# Patient Record
Sex: MALE | Race: WHITE
[De-identification: names, ages, dates, MRNs, and addresses within clinical notes are randomized per-mention and may not be internally consistent; named-entity substitution may affect disease eponyms.]

---

## 2023-07-18 ENCOUNTER — HOSPITAL ENCOUNTER (EMERGENCY)
Age: 28
LOS: 1 days | Discharge: HOME | End: 2023-07-19
Payer: MEDICAID

## 2023-07-18 VITALS
RESPIRATION RATE: 10 BRPM | DIASTOLIC BLOOD PRESSURE: 67 MMHG | HEART RATE: 93 BPM | OXYGEN SATURATION: 97 % | SYSTOLIC BLOOD PRESSURE: 122 MMHG

## 2023-07-18 VITALS
SYSTOLIC BLOOD PRESSURE: 119 MMHG | OXYGEN SATURATION: 94 % | DIASTOLIC BLOOD PRESSURE: 59 MMHG | RESPIRATION RATE: 12 BRPM | HEART RATE: 105 BPM

## 2023-07-18 VITALS
RESPIRATION RATE: 10 BRPM | HEART RATE: 96 BPM | SYSTOLIC BLOOD PRESSURE: 114 MMHG | DIASTOLIC BLOOD PRESSURE: 72 MMHG | OXYGEN SATURATION: 95 %

## 2023-07-18 VITALS
DIASTOLIC BLOOD PRESSURE: 71 MMHG | HEART RATE: 88 BPM | OXYGEN SATURATION: 97 % | SYSTOLIC BLOOD PRESSURE: 120 MMHG | RESPIRATION RATE: 13 BRPM

## 2023-07-18 VITALS
DIASTOLIC BLOOD PRESSURE: 73 MMHG | OXYGEN SATURATION: 96 % | RESPIRATION RATE: 9 BRPM | SYSTOLIC BLOOD PRESSURE: 107 MMHG | HEART RATE: 97 BPM

## 2023-07-18 VITALS
OXYGEN SATURATION: 95 % | HEART RATE: 98 BPM | DIASTOLIC BLOOD PRESSURE: 70 MMHG | RESPIRATION RATE: 12 BRPM | SYSTOLIC BLOOD PRESSURE: 112 MMHG

## 2023-07-18 VITALS — SYSTOLIC BLOOD PRESSURE: 113 MMHG | OXYGEN SATURATION: 96 % | HEART RATE: 100 BPM | DIASTOLIC BLOOD PRESSURE: 65 MMHG

## 2023-07-18 VITALS
SYSTOLIC BLOOD PRESSURE: 112 MMHG | OXYGEN SATURATION: 93 % | DIASTOLIC BLOOD PRESSURE: 58 MMHG | HEART RATE: 104 BPM | RESPIRATION RATE: 12 BRPM

## 2023-07-18 VITALS
DIASTOLIC BLOOD PRESSURE: 72 MMHG | HEART RATE: 95 BPM | RESPIRATION RATE: 10 BRPM | SYSTOLIC BLOOD PRESSURE: 116 MMHG | OXYGEN SATURATION: 95 %

## 2023-07-18 VITALS — RESPIRATION RATE: 20 BRPM | OXYGEN SATURATION: 99 % | HEART RATE: 143 BPM

## 2023-07-18 VITALS
TEMPERATURE: 97.34 F | HEART RATE: 144 BPM | RESPIRATION RATE: 22 BRPM | DIASTOLIC BLOOD PRESSURE: 87 MMHG | OXYGEN SATURATION: 94 % | SYSTOLIC BLOOD PRESSURE: 134 MMHG

## 2023-07-18 VITALS — RESPIRATION RATE: 24 BRPM | HEART RATE: 139 BPM

## 2023-07-18 VITALS
HEART RATE: 91 BPM | SYSTOLIC BLOOD PRESSURE: 108 MMHG | DIASTOLIC BLOOD PRESSURE: 67 MMHG | OXYGEN SATURATION: 98 % | RESPIRATION RATE: 14 BRPM

## 2023-07-18 VITALS
SYSTOLIC BLOOD PRESSURE: 135 MMHG | DIASTOLIC BLOOD PRESSURE: 85 MMHG | OXYGEN SATURATION: 94 % | HEART RATE: 109 BPM | RESPIRATION RATE: 12 BRPM

## 2023-07-18 VITALS — OXYGEN SATURATION: 96 % | HEART RATE: 96 BPM

## 2023-07-18 VITALS — DIASTOLIC BLOOD PRESSURE: 59 MMHG | HEART RATE: 107 BPM | RESPIRATION RATE: 12 BRPM | SYSTOLIC BLOOD PRESSURE: 120 MMHG

## 2023-07-18 VITALS — DIASTOLIC BLOOD PRESSURE: 61 MMHG | RESPIRATION RATE: 11 BRPM | HEART RATE: 103 BPM | SYSTOLIC BLOOD PRESSURE: 112 MMHG

## 2023-07-18 VITALS
SYSTOLIC BLOOD PRESSURE: 119 MMHG | DIASTOLIC BLOOD PRESSURE: 71 MMHG | HEART RATE: 93 BPM | RESPIRATION RATE: 10 BRPM | OXYGEN SATURATION: 95 %

## 2023-07-18 VITALS
OXYGEN SATURATION: 94 % | RESPIRATION RATE: 23 BRPM | SYSTOLIC BLOOD PRESSURE: 148 MMHG | HEART RATE: 121 BPM | DIASTOLIC BLOOD PRESSURE: 94 MMHG

## 2023-07-18 VITALS — OXYGEN SATURATION: 96 % | RESPIRATION RATE: 9 BRPM | HEART RATE: 94 BPM

## 2023-07-18 VITALS — HEART RATE: 133 BPM

## 2023-07-18 VITALS — BODY MASS INDEX: 22.8 KG/M2

## 2023-07-18 DIAGNOSIS — B37.9: ICD-10-CM

## 2023-07-18 DIAGNOSIS — F17.210: ICD-10-CM

## 2023-07-18 DIAGNOSIS — F15.10: Primary | ICD-10-CM

## 2023-07-18 LAB
ADD MANUAL DIFF: NO
ALANINE AMINOTRANSFERASE: 37 U/L (ref 16–63)
ALBUMIN GLOBULIN RATIO: 1
ALBUMIN LEVEL: 3.7 G/DL (ref 3.4–5)
ALKALINE PHOSPHATASE: 110 U/L (ref 46–116)
ANION GAP: 12.8
ASPARTATE AMINO TRANSFERASE: 24 U/L (ref 15–37)
BLOOD UREA NITROGEN: 13 MG/DL (ref 7–18)
CALCIUM: 8.9 MG/DL (ref 8.5–10.1)
CARBON DIOXIDE: 27 MMOL/L (ref 21–32)
CHLORIDE: 102 MMOL/L (ref 98–107)
CO2 BLD-SCNC: 27 MMOL/L (ref 21–32)
ESTIMATED GFR (AFRICAN AMERICA: >60 (ref 60–?)
ESTIMATED GFR (NON-AFRICAN AME: >60 (ref 60–?)
GLOBULIN: 3.8 G/DL
GLUCOSE BLD-MCNC: 131 MG/DL (ref 74–106)
HCT VFR BLD CALC: 40.3 % (ref 42–54)
HEMATOCRIT: 40.3 % (ref 42–54)
HEMOGLOBIN: 13.7 G/DL (ref 14–18)
IMMATURE GRANULOCYTES ABS AUTO: 0.02 10^3/UL (ref 0–0.03)
IMMATURE GRANULOCYTES PCT AUTO: 0.2 % (ref 0–0.5)
LYMPHOCYTES  ABSOLUTE AUTO: 3.2 10^3/UL (ref 1.2–3.8)
MCV RBC: 86.1 FL (ref 80–94)
MEAN CORPUSCULAR HEMOGLOBIN: 29.3 PG (ref 25.9–34)
MEAN CORPUSCULAR HGB CONC: 34 G/DL (ref 29.9–35.2)
MEAN CORPUSCULAR VOLUME: 86.1 FL (ref 80–94)
PLATELET # BLD: 430 10^3/UL (ref 150–450)
PLATELET COUNT: 430 10^3/UL (ref 150–450)
POTASSIUM SERPLBLD-SCNC: 3.8 MMOL/L (ref 3.5–5.1)
POTASSIUM: 3.8 MMOL/L (ref 3.5–5.1)
RED BLOOD COUNT: 4.68 10^6/UL (ref 4.7–6.1)
SODIUM BLD-SCNC: 138 MMOL/L (ref 136–145)
SODIUM: 138 MMOL/L (ref 136–145)
TOTAL PROTEIN: 7.5 G/DL (ref 6.4–8.2)
WBC # BLD: 9.5 10^3/UL (ref 4–11)
WHITE BLOOD COUNT: 9.5 10^3/UL (ref 4–11)

## 2023-07-18 PROCEDURE — 85025 COMPLETE CBC W/AUTO DIFF WBC: CPT

## 2023-07-18 PROCEDURE — 96375 TX/PRO/DX INJ NEW DRUG ADDON: CPT

## 2023-07-18 PROCEDURE — 70450 CT HEAD/BRAIN W/O DYE: CPT

## 2023-07-18 PROCEDURE — 84484 ASSAY OF TROPONIN QUANT: CPT

## 2023-07-18 PROCEDURE — 99285 EMERGENCY DEPT VISIT HI MDM: CPT

## 2023-07-18 PROCEDURE — 80053 COMPREHEN METABOLIC PANEL: CPT

## 2023-07-18 PROCEDURE — 36415 COLL VENOUS BLD VENIPUNCTURE: CPT

## 2023-07-18 PROCEDURE — 96374 THER/PROPH/DIAG INJ IV PUSH: CPT

## 2023-07-18 PROCEDURE — 93005 ELECTROCARDIOGRAM TRACING: CPT

## 2023-07-18 NOTE — CT_ITS
The [address] [address] 
     [phone] 
  
  
Patient Name: 
YANELI CHRISTOPHER 
  
MRN: [id number]    YOB: 1995    Sex: M 
Assigned Patient Location: ER 
Current Patient Location: ER 
Accession/Order Number: [id number] 
Exam Date: 7/18/2023  23:10    Report Date: 7/18/2023  23:28 
  
At the request of: 
CHRISTINE  MARKER   
  
Procedure:  CT head/brain wo con 
  
EXAMINATION: CT head/brain wo con, 7/18/2023 11:10 PM EDT 
  
HISTORY: AMS 
  
COMPARISON: CT head 3/30/2022.  
  
TECHNIQUE: CT scan of the head was performed without IV contrast. CT dose  
reduction technique was used, including Automated Exposure Control.  
  
FINDINGS:  
  
BRAIN PARENCHYMA/CSF SPACES: Ventricles are normal in size for age. There is  
no  
hemorrhage, mass effect or midline shift. There are no other significant  
findings. 
  
PARANASAL SINUSES: Clear. 
  
SKULL BASE AND CALVARIUM: Normal. 
  
EXTRACRANIAL SOFT TISSUES: Normal. 
  
ORDER #: [id number] CT/CT head/brain wo con  
IMPRESSION:   
   
Normal noncontrast head CT.  
   
   
Electronically authenticated by: SHA DÍAZ   Date: 7/18/2023  23:28

## 2023-07-18 NOTE — ED_ITS
HPI - General Adult    
General    
Chief complaint: Anxiety    
Stated complaint: Panic Attack    
Time Seen by Provider: 07/18/23 19:37    
Source: patient    
Mode of arrival: walk-in    
History of Present Illness    
HPI narrative:     
This 27-year-old male to emergency Department for anxiety and restlessness after  
using methamphetamine and fentanyl actually 3 hours ago. He states he feels   
terrible. He has used these drugs in the past. He denies that he injects them   
intravenously but states he was snorting them and smoking them. Upon arrival he   
is very anxious, moving all over, cannnot calm down and stating that he feels   
terrible. He is not vomiting. He denies any chest pain or shortness of breath.    
Related Data    
                                    Allergies    
    
    
    
Allergy/AdvReac Type Severity Reaction Status Date / Time    
     
No Known Drug Allergies Allergy   Verified 07/18/23 19:42    
    
    
    
    
Review of Systems    
    
    
ROS      
    
 Status of ROS unobtainable due to medical condition (under the influence of   
methamphetamine and fentanyl)       
    
    
PFSH    
PFSH    
Social History    
Smoking status:  Current every day smoker     
    
    
    
Exam    
Narrative    
Exam Narrative:     
Nurses note and vital signs reviewed and patient is not hypoxic.    
    
General:  The patient appears Anxious with psychomotor agitation and nonstop   
movement of his lower extremities, no respiratory distress, GCS 15.    
Skin:  Warm, dry, no pallor noted.  There is no rash noted.    
Head:  Normocephalic, atraumatic    
Eye: Conjunctiva are injected, patient is tearful, no scleral icterus noted    
Ears, Nose, Mouth, and Throat: oral mucosa is moist. Nares patent. Mouth without  
vesicles. Ear canals patent. Tm's without Erythema    
Cardiovascular:  Regular Rate and Rhythm, tachycardia with pulse in the 130-   
140s    
Respiratory:  Patient is in no distress, no accessory muscle use, lungs are   
clear to auscultation, no wheezing, rales or rhonchi    
Back:  non-tender, no CVA tenderness bilaterally to percussion.    
GI:  Normal bowel sounds, no tenderness to palpation, no masses appreciated.  No  
rebound, guarding, or rigidity noted.    
Musculoskeletal: The patient has no evidence of calf tenderness, no pitting   
edema, symmetrical pulses noted bilaterally    
Neurological:  A&O x4, normal speech    
Psychiatric:  agitated, anxious, tearful    
Constitutional    
Vital Signs, click to edit/add:     
    
                                Last Vital Signs    
    
    
    
Temp  97.3 F L  07/18/23 19:38    
     
Pulse  87   07/19/23 05:30    
     
Resp  14   07/19/23 05:30    
     
BP  118/78   07/19/23 05:30    
     
Pulse Ox  97   07/19/23 05:30    
     
O2 Del Method  Room Air  07/18/23 19:38    
    
    
    
    
    
Course    
Vital Signs    
Vital signs:     
    
                                   Vital Signs    
    
    
    
Temperature  97.3 F L  07/18/23 19:38    
     
Pulse Rate  144 H  07/18/23 19:38    
     
Respiratory Rate  22   07/18/23 19:38    
     
Blood Pressure  134/87 H  07/18/23 19:38    
     
Pulse Oximetry  94 L  07/18/23 19:38    
     
Oxygen Delivery Method  Room Air  07/18/23 19:38    
    
    
                                            
    
    
    
Temperature  97.3 F L  07/18/23 19:38    
     
Pulse Rate  87   07/19/23 05:30    
     
Respiratory Rate  14   07/19/23 05:30    
     
Blood Pressure  118/78   07/19/23 05:30    
     
Pulse Oximetry  97   07/19/23 05:30    
     
Oxygen Delivery Method  Room Air  07/18/23 19:38    
    
    
    
    
    
Medical Decision Making    
MDM Narrative    
Medical decision making narrative:     
27-year-old male with a history of substance abuse who has been in and out of   
rehab and sober living environment is presents for evaluation after he   
intentionally snorted and smoked methamphetamine and fentanyl. He has been on   
Suboxone in the past. He presents stating that he is scared and feels terrible.   
He allegedly took the medications approximately 3 hoours prior to arrival. I had  
a lengthy discussion with his mother who states that she thinks that he is   
relapsing because he is currently staying with his father who is dying of   
cancer. She wishes that when he gets discharged he can be released with her. She  
did speak to his boss who is also his uncle and he had a normal day at work. His  
uncle dropped him off at the [facility] to get an ID. Allegedly   
somebody must have picked him up from the [facility] and gave him the drugs that he   
took. Upon arrival he was markedly tachycardic with pulse 130s. EKG was sinus   
tachycardia at 133 beats a minute. An IV was placed and he was medicated with IV  
fluids and 2 mg of IV Ativan. This relieved his agitation and he was able to   
rest and sleep. He has normal CBC with differential and normal comprehensive   
metabolic profile. He has continually been on the cardiac monitor and has not   
had any episodes of hypoxia or tachycardia since given the fluids and Ativan. CT  
scan of the brain was performed due to his tachycardia followed by deep sedation  
and is negative for acute findings. The patient's mother requested to go home   
and be called to come pick him up when he is awake and stable for discharge. The  
patient was monitored overnight in room 6. He is not have any episodes of   
desaturation, tachycardia or hypotension. He is now easily arousable and is   
drinking fluids and having a light snack. His mother called and he will be   
discharged home when she arrives.    
Lab Data    
Labs:     
    
                                   Lab Results    
    
    
    
  07/18/23 Range/Units    
    
  19:31     
     
WBC  9.5  (4.0-11.0)  10^3/uL    
     
RBC  4.68 L  (4.70-6.10)  10^6/uL    
     
Hgb  13.7 L  (14.0-18.0)  g/dL    
     
Hct  40.3 L  (42.0-54.0)  %    
     
MCV  86.1  (80.0-94.0)  fL    
     
MCH  29.3  (25.9-34.0)  pg    
     
MCHC  34.0  (29.9-35.2)  g/dL    
     
RDW  12.0  (11.0-15.0)  %    
     
Plt Count  430  (150-450)  10^3/uL    
     
MPV  9.0 L  (9.5-13.5)  fL    
     
Neut % (Auto)  55.9  (43.0-75.0)  %    
     
Lymph % (Auto)  33.6  (20.5-60.0)  %    
     
Mono % (Auto)  6.8  (1.7-12.0)  %    
     
Eos % (Auto)  2.8  (0.9-7.0)  %    
     
Baso % (Auto)  0.7  (0.2-2.0)  %    
     
Neut # (Auto)  5.3  (1.4-6.5)  10^3/uL    
     
Lymph # (Auto)  3.2  (1.2-3.8)  10^3/uL    
     
Mono # (Auto)  0.6  (0.3-0.8)  10^3/uL    
     
Eos # (Auto)  0.3  (0.0-0.7)  10^3/uL    
     
Baso # (Auto)  0.1  (0.0-0.1)  10^3/uL    
     
Abs Immat Gran (auto)  0.02  (0.00-0.03)  10^3/uL    
     
Imm/Tot Granulo (auto)  0.2  (0.0-0.5)  %    
     
Sodium  138  (136-145)  mmol/L    
     
Potassium  3.8  (3.5-5.1)  mmol/L    
     
Chloride  102  ()  mmol/L    
     
Carbon Dioxide  27.0  (21.0-32.0)  mmol/L    
     
Anion Gap  12.8      
     
BUN  13.0  (7.0-18.0)  mg/dL    
     
Creatinine  0.92  (0.70-1.30)  mg/dL    
     
Est GFR ( Amer)  >60  (>=60)      
     
Est GFR (Non-Af Amer)  >60  (>=60)      
     
BUN/Creatinine Ratio  14.1      
     
Glucose  131 H  ()  mg/dL    
     
Calcium  8.9  (8.5-10.1)  mg/dL    
     
Total Bilirubin  0.1 L  (0.2-1.0)  mg/dL    
     
AST  24  (15-37)  U/L    
     
ALT  37  (16-63)  U/L    
     
Alkaline Phosphatase  110  ()  U/L    
     
Total Protein  7.5  (6.4-8.2)  g/dL    
     
Albumin  3.7  (3.4-5.0)  g/dL    
     
Globulin  3.8  g/dL    
     
Albumin/Globulin Ratio  1.0      
    
    
    
    
ECG Data    
Attestation: I personally reviewed and interpreted this ECG as follows: (Sinus   
tachycardia at 132 beats for minute, right axis deviation, nonspecific ST   
changes, no acute ST segment elevation or T-wave inversion)    
    
Discharge Plan    
Discharge    
Chief Complaint: Anxiety    
    
Clinical Impression:    
 Active substance abuse    
    
    
Patient Disposition: Home, Self-Care    
    
Time of Disposition Decision: 06:03    
    
Condition: Good    
    
Instructions:  Polysubstance Use Disorder (ED)    
    
Stand Alone Forms:  Portal Instructions    
    
Referrals:    
Ashutosh Diaz MD [Primary Care Provider] - 1 week

## 2023-07-18 NOTE — ECG_ITS
The [facility] 
                                        
                                       Test Date:    2023 
Pat Name:     YANELI CHRISTOPHER            Department:    
Patient ID:   [id number]               Room:         - 
Gender:       Male                     Technician:    
:          1995               Requested By: CLARISA CHRISTINE 
Order Number: [id number]              Reading MD:   CLARISA CHRISTINE 
                                 Measurements 
Intervals                              Axis           
Rate:         133                      P:            240 
NH:           164                      QRS:          100 
QRSD:         84                       T:            66 
QT:           306                                     
QTc:          384                                     
                           Interpretive Statements 
1220 Rapid atrial rhythm 
7102 Moderate right axis deviation 
0101 Possible arm leads reversed, check lead requested 
9140 **  abnormal rhythm ECG  ** 
No previous ECG available for comparison 
Electronically Signed On 2023 6:38:02 EDT by CLARISA CHRISTINE

## 2023-07-18 NOTE — ED.GENADUL1
HPI - General Adult
General
Chief complaint: Anxiety
Stated complaint: Panic Attack
Time Seen by Provider: 07/18/23 19:37
Source: patient
Mode of arrival: walk-in
History of Present Illness
HPI narrative: 
This 27-year-old male to emergency Department for anxiety and restlessness after using methamphetamine and fentanyl actually 3 hours ago. He states he feels terrible. He has used these drugs in the past. He denies that he injects them intravenously 
but states he was snorting them and smoking them. Upon arrival he is very anxious, moving all over, cannnot calm down and stating that he feels terrible. He is not vomiting. He denies any chest pain or shortness of breath.
Related Data
Allergies

Allergy/AdvReac Type Severity Reaction Status Date / Time
No Known Drug Allergies Allergy   Verified 07/18/23 19:42



Review of Systems
ROS  
 Status of ROS unobtainable due to medical condition (under the influence of methamphetamine and fentanyl)   

PFSH
PFSH
Social History
Smoking status:  Current every day smoker 



Exam
Narrative
Exam Narrative: 
Nurses note and vital signs reviewed and patient is not hypoxic.

General:  The patient appears Anxious with psychomotor agitation and nonstop movement of his lower extremities, no respiratory distress, GCS 15.
Skin:  Warm, dry, no pallor noted.  There is no rash noted.
Head:  Normocephalic, atraumatic
Eye: Conjunctiva are injected, patient is tearful, no scleral icterus noted
Ears, Nose, Mouth, and Throat: oral mucosa is moist. Nares patent. Mouth without vesicles. Ear canals patent. Tm's without Erythema
Cardiovascular:  Regular Rate and Rhythm, tachycardia with pulse in the 130- 140s
Respiratory:  Patient is in no distress, no accessory muscle use, lungs are clear to auscultation, no wheezing, rales or rhonchi
Back:  non-tender, no CVA tenderness bilaterally to percussion.
GI:  Normal bowel sounds, no tenderness to palpation, no masses appreciated.  No rebound, guarding, or rigidity noted.
Musculoskeletal: The patient has no evidence of calf tenderness, no pitting edema, symmetrical pulses noted bilaterally
Neurological:  A&O x4, normal speech
Psychiatric:  agitated, anxious, tearful
Constitutional
Vital Signs, click to edit/add: 

Last Vital Signs

Temp  97.3 F L  07/18/23 19:38
Pulse  87   07/19/23 05:30
Resp  14   07/19/23 05:30
BP  118/78   07/19/23 05:30
Pulse Ox  97   07/19/23 05:30
O2 Del Method  Room Air  07/18/23 19:38




Course
Vital Signs
Vital signs: 

Vital Signs

Temperature  97.3 F L  07/18/23 19:38
Pulse Rate  144 H  07/18/23 19:38
Respiratory Rate  22   07/18/23 19:38
Blood Pressure  134/87 H  07/18/23 19:38
Pulse Oximetry  94 L  07/18/23 19:38
Oxygen Delivery Method  Room Air  07/18/23 19:38



Temperature  97.3 F L  07/18/23 19:38
Pulse Rate  87   07/19/23 05:30
Respiratory Rate  14   07/19/23 05:30
Blood Pressure  118/78   07/19/23 05:30
Pulse Oximetry  97   07/19/23 05:30
Oxygen Delivery Method  Room Air  07/18/23 19:38




Medical Decision Making
MDM Narrative
Medical decision making narrative: 
27-year-old male with a history of substance abuse who has been in and out of rehab and sober living environment is presents for evaluation after he intentionally snorted and smoked methamphetamine and fentanyl. He has been on Suboxone in the past. 
He presents stating that he is scared and feels terrible. He allegedly took the medications approximately 3 hoours prior to arrival. I had a lengthy discussion with his mother who states that she thinks that he is relapsing because he is currently 
staying with his father who is dying of cancer. She wishes that when he gets discharged he can be released with her. She did speak to his boss who is also his uncle and he had a normal day at work. His uncle dropped him off at the [employer] [facility] [place] to get an ID. Allegedly somebody must have picked him up from the [facility] and gave him the drugs that he took. Upon arrival he was markedly tachycardic with pulse 130s. EKG was sinus tachycardia at 133 beats a minute. An IV was placed and he 
was medicated with IV fluids and 2 mg of IV Ativan. This relieved his agitation and he was able to rest and sleep. He has normal CBC with differential and normal comprehensive metabolic profile. He has continually been on the cardiac monitor and has 
not had any episodes of hypoxia or tachycardia since given the fluids and Ativan. CT scan of the brain was performed due to his tachycardia followed by deep sedation and is negative for acute findings. The patient's mother requested to go home and 
be called to come pick him up when he is awake and stable for discharge. The patient was monitored overnight in room 6. He is not have any episodes of desaturation, tachycardia or hypotension. He is now easily arousable and is drinking fluids and 
having a light snack. His mother called and he will be discharged home when she arrives.
Lab Data
Labs: 

Lab Results

  07/18/23 Range/Units
  19:31 
WBC  9.5  (4.0-11.0)  10^3/uL
RBC  4.68 L  (4.70-6.10)  10^6/uL
Hgb  13.7 L  (14.0-18.0)  g/dL
Hct  40.3 L  (42.0-54.0)  %
MCV  86.1  (80.0-94.0)  fL
MCH  29.3  (25.9-34.0)  pg
MCHC  34.0  (29.9-35.2)  g/dL
RDW  12.0  (11.0-15.0)  %
Plt Count  430  (150-450)  10^3/uL
MPV  9.0 L  (9.5-13.5)  fL
Neut % (Auto)  55.9  (43.0-75.0)  %
Lymph % (Auto)  33.6  (20.5-60.0)  %
Mono % (Auto)  6.8  (1.7-12.0)  %
Eos % (Auto)  2.8  (0.9-7.0)  %
Baso % (Auto)  0.7  (0.2-2.0)  %
Neut # (Auto)  5.3  (1.4-6.5)  10^3/uL
Lymph # (Auto)  3.2  (1.2-3.8)  10^3/uL
Mono # (Auto)  0.6  (0.3-0.8)  10^3/uL
Eos # (Auto)  0.3  (0.0-0.7)  10^3/uL
Baso # (Auto)  0.1  (0.0-0.1)  10^3/uL
Abs Immat Gran (auto)  0.02  (0.00-0.03)  10^3/uL
Imm/Tot Granulo (auto)  0.2  (0.0-0.5)  %
Sodium  138  (136-145)  mmol/L
Potassium  3.8  (3.5-5.1)  mmol/L
Chloride  102  ()  mmol/L
Carbon Dioxide  27.0  (21.0-32.0)  mmol/L
Anion Gap  12.8  
BUN  13.0  (7.0-18.0)  mg/dL
Creatinine  0.92  (0.70-1.30)  mg/dL
Est GFR ( Amer)  >60  (>=60)  
Est GFR (Non-Af Amer)  >60  (>=60)  
BUN/Creatinine Ratio  14.1  
Glucose  131 H  ()  mg/dL
Calcium  8.9  (8.5-10.1)  mg/dL
Total Bilirubin  0.1 L  (0.2-1.0)  mg/dL
AST  24  (15-37)  U/L
ALT  37  (16-63)  U/L
Alkaline Phosphatase  110  ()  U/L
Total Protein  7.5  (6.4-8.2)  g/dL
Albumin  3.7  (3.4-5.0)  g/dL
Globulin  3.8  g/dL
Albumin/Globulin Ratio  1.0  



ECG Data
Attestation: I personally reviewed and interpreted this ECG as follows: (Sinus tachycardia at 132 beats for minute, right axis deviation, nonspecific ST changes, no acute ST segment elevation or T-wave inversion)

Discharge Plan
Discharge
Chief Complaint: Anxiety

Clinical Impression:
 Active substance abuse


Patient Disposition: Home, Self-Care

Time of Disposition Decision: 06:03

Condition: Good

Instructions:  Polysubstance Use Disorder (ED)

Stand Alone Forms:  Portal Instructions

Referrals:
Ashutosh Diaz MD [Primary Care Provider] - 1 week

## 2023-07-19 VITALS
RESPIRATION RATE: 15 BRPM | SYSTOLIC BLOOD PRESSURE: 118 MMHG | OXYGEN SATURATION: 100 % | DIASTOLIC BLOOD PRESSURE: 82 MMHG

## 2023-07-19 VITALS
OXYGEN SATURATION: 98 % | SYSTOLIC BLOOD PRESSURE: 102 MMHG | DIASTOLIC BLOOD PRESSURE: 74 MMHG | HEART RATE: 88 BPM | RESPIRATION RATE: 15 BRPM

## 2023-07-19 VITALS
DIASTOLIC BLOOD PRESSURE: 85 MMHG | RESPIRATION RATE: 12 BRPM | OXYGEN SATURATION: 98 % | SYSTOLIC BLOOD PRESSURE: 112 MMHG | HEART RATE: 85 BPM

## 2023-07-19 VITALS
DIASTOLIC BLOOD PRESSURE: 78 MMHG | HEART RATE: 89 BPM | OXYGEN SATURATION: 98 % | RESPIRATION RATE: 9 BRPM | SYSTOLIC BLOOD PRESSURE: 114 MMHG

## 2023-07-19 VITALS — SYSTOLIC BLOOD PRESSURE: 122 MMHG | DIASTOLIC BLOOD PRESSURE: 81 MMHG | HEART RATE: 83 BPM | OXYGEN SATURATION: 98 %

## 2023-07-19 VITALS — DIASTOLIC BLOOD PRESSURE: 86 MMHG | SYSTOLIC BLOOD PRESSURE: 109 MMHG

## 2023-07-19 VITALS
HEART RATE: 89 BPM | RESPIRATION RATE: 14 BRPM | DIASTOLIC BLOOD PRESSURE: 78 MMHG | SYSTOLIC BLOOD PRESSURE: 118 MMHG | OXYGEN SATURATION: 97 %

## 2023-07-19 VITALS — DIASTOLIC BLOOD PRESSURE: 81 MMHG | HEART RATE: 87 BPM | OXYGEN SATURATION: 98 % | SYSTOLIC BLOOD PRESSURE: 124 MMHG

## 2023-07-19 VITALS
SYSTOLIC BLOOD PRESSURE: 116 MMHG | RESPIRATION RATE: 17 BRPM | OXYGEN SATURATION: 97 % | DIASTOLIC BLOOD PRESSURE: 71 MMHG | HEART RATE: 98 BPM

## 2023-07-19 VITALS — DIASTOLIC BLOOD PRESSURE: 80 MMHG | RESPIRATION RATE: 11 BRPM | SYSTOLIC BLOOD PRESSURE: 116 MMHG | HEART RATE: 81 BPM

## 2023-07-19 VITALS
RESPIRATION RATE: 10 BRPM | SYSTOLIC BLOOD PRESSURE: 110 MMHG | DIASTOLIC BLOOD PRESSURE: 73 MMHG | OXYGEN SATURATION: 96 % | HEART RATE: 85 BPM

## 2023-07-19 VITALS
SYSTOLIC BLOOD PRESSURE: 103 MMHG | RESPIRATION RATE: 13 BRPM | HEART RATE: 85 BPM | OXYGEN SATURATION: 97 % | DIASTOLIC BLOOD PRESSURE: 73 MMHG

## 2023-07-19 VITALS
RESPIRATION RATE: 8 BRPM | DIASTOLIC BLOOD PRESSURE: 70 MMHG | OXYGEN SATURATION: 98 % | HEART RATE: 86 BPM | SYSTOLIC BLOOD PRESSURE: 99 MMHG

## 2023-09-21 ENCOUNTER — HOSPITAL ENCOUNTER (EMERGENCY)
Age: 28
LOS: 1 days | Discharge: HOME | End: 2023-09-22
Payer: MEDICAID

## 2023-09-21 VITALS
TEMPERATURE: 97.1 F | OXYGEN SATURATION: 97 % | HEART RATE: 117 BPM | DIASTOLIC BLOOD PRESSURE: 60 MMHG | SYSTOLIC BLOOD PRESSURE: 102 MMHG | RESPIRATION RATE: 20 BRPM

## 2023-09-21 VITALS — BODY MASS INDEX: 23.1 KG/M2

## 2023-09-21 DIAGNOSIS — F11.23: Primary | ICD-10-CM

## 2023-09-21 DIAGNOSIS — F17.210: ICD-10-CM

## 2023-09-21 DIAGNOSIS — G25.81: ICD-10-CM

## 2023-09-21 DIAGNOSIS — Z79.899: ICD-10-CM

## 2023-09-21 PROCEDURE — 96374 THER/PROPH/DIAG INJ IV PUSH: CPT

## 2023-09-21 PROCEDURE — 80053 COMPREHEN METABOLIC PANEL: CPT

## 2023-09-21 PROCEDURE — 96361 HYDRATE IV INFUSION ADD-ON: CPT

## 2023-09-21 PROCEDURE — 99285 EMERGENCY DEPT VISIT HI MDM: CPT

## 2023-09-21 PROCEDURE — 93005 ELECTROCARDIOGRAM TRACING: CPT

## 2023-09-21 PROCEDURE — 36415 COLL VENOUS BLD VENIPUNCTURE: CPT

## 2023-09-21 PROCEDURE — 96376 TX/PRO/DX INJ SAME DRUG ADON: CPT

## 2023-09-21 PROCEDURE — 96375 TX/PRO/DX INJ NEW DRUG ADDON: CPT

## 2023-09-21 PROCEDURE — 85025 COMPLETE CBC W/AUTO DIFF WBC: CPT

## 2023-09-21 NOTE — PC.NURSE
patient states he has been trying to detox at home since monday and is currently going through withdraw. states he last had alcohol on monday and last used fentanyl on monday. mother states he saw his PCP approx2 days ago and was prescribed 
medication but the medication does not seem to be helping enough. patient states he feels shaky, has restless legs, and is nauseous. states he just needs help getting through these symptoms. states he has been to a detox center previously but does 
not want to go to another center at this time, states he wants to continue trying to detox at home.

## 2023-09-22 VITALS — OXYGEN SATURATION: 98 %

## 2023-09-22 VITALS
OXYGEN SATURATION: 96 % | RESPIRATION RATE: 16 BRPM | DIASTOLIC BLOOD PRESSURE: 78 MMHG | SYSTOLIC BLOOD PRESSURE: 112 MMHG

## 2023-09-22 LAB
ADD MANUAL DIFF: NO
ALANINE AMINOTRANSFERASE: 25 U/L (ref 16–63)
ALBUMIN GLOBULIN RATIO: 1.1
ALBUMIN LEVEL: 3.2 G/DL (ref 3.4–5)
ALKALINE PHOSPHATASE: 59 U/L (ref 46–116)
ANION GAP: 12.5
ASPARTATE AMINO TRANSFERASE: 12 U/L (ref 15–37)
BLOOD UREA NITROGEN: 10 MG/DL (ref 7–18)
CALCIUM: 8.5 MG/DL (ref 8.5–10.1)
CARBON DIOXIDE: 24.7 MMOL/L (ref 21–32)
CHLORIDE: 101 MMOL/L (ref 98–107)
CO2 BLD-SCNC: 24.7 MMOL/L (ref 21–32)
ESTIMATED GFR (AFRICAN AMERICA: >60 (ref 60–?)
ESTIMATED GFR (NON-AFRICAN AME: >60 (ref 60–?)
GLOBULIN: 2.9 G/DL
GLUCOSE BLD-MCNC: 100 MG/DL (ref 74–106)
HCT VFR BLD CALC: 35.9 % (ref 42–54)
HEMATOCRIT: 35.9 % (ref 42–54)
HEMOGLOBIN: 12.4 G/DL (ref 14–18)
IMMATURE GRANULOCYTES ABS AUTO: 0.01 10^3/UL (ref 0–0.03)
IMMATURE GRANULOCYTES PCT AUTO: 0.1 % (ref 0–0.5)
LYMPHOCYTES  ABSOLUTE AUTO: 1.9 10^3/UL (ref 1.2–3.8)
MCV RBC: 85.1 FL (ref 80–94)
MEAN CORPUSCULAR HEMOGLOBIN: 29.4 PG (ref 25.9–34)
MEAN CORPUSCULAR HGB CONC: 34.5 G/DL (ref 29.9–35.2)
MEAN CORPUSCULAR VOLUME: 85.1 FL (ref 80–94)
PLATELET # BLD: 280 10^3/UL (ref 150–450)
PLATELET COUNT: 280 10^3/UL (ref 150–450)
POTASSIUM SERPLBLD-SCNC: 3.2 MMOL/L (ref 3.5–5.1)
POTASSIUM: 3.2 MMOL/L (ref 3.5–5.1)
RED BLOOD COUNT: 4.22 10^6/UL (ref 4.7–6.1)
SODIUM BLD-SCNC: 135 MMOL/L (ref 136–145)
SODIUM: 135 MMOL/L (ref 136–145)
TOTAL PROTEIN: 6.1 G/DL (ref 6.4–8.2)
WBC # BLD: 7.8 10^3/UL (ref 4–11)
WHITE BLOOD COUNT: 7.8 10^3/UL (ref 4–11)

## 2023-09-22 NOTE — ED_ITS
HPI - General Adult    
General    
Chief complaint: Alcohol    
Stated complaint: detox day 4    
Time Seen by Provider: 09/21/23 23:55    
Source: patient and family (mother)    
History of Present Illness    
HPI narrative:     
This 27-year-old male with a history of substance abuse particularly heroin and   
fentanyl who has been attempting to detox at home for the past 2 days is brought  
to the emergency department by his mother for evaluation of restless legs,   
generalized malaise, nausea and diarrhea. He was seen recently by Namita Ramirez and   
prescribed several medications to help him with his withdrawal symptoms. The   
patient states that he does occasionally drink alcohol but never typically has   
or than 3 beers. Upon arrival he is somnolent and difficult to examine. When I   
was able to stimulate him to sit up he started thrashing around with psychomotor  
movements of his arms and legs. He states that he cannot control his shaking.   
This immediately is followed by resting on the bed with no psychomotor   
agitation. He states that the last time he used any fentanyl was 3 days ago. He   
has been trying to detox at his girlfriends mothers house but went to his   
mother's house this evening and requested to be brought here. The mother states   
that he also uses methamphetamine. She thinks that he typically uses   
methamphetamine to the point that he cannot rest and then uses opiates to get   
him to sleep. He has been in detox centers before which have not seemed to help   
him. The mother states she thinks that they just sedate him the whole time is   
there and when he leaves he uses again.    
Related Data    
                                Home Medications    
    
    
    
 Medication  Instructions  Recorded  Confirmed    
     
dicyclomine 20 mg tablet 20 mg PO TID 09/21/23 09/21/23    
     
pramipexole 0.5 mg tablet 0.5 mg PO DAILY 09/21/23 09/21/23    
     
quetiapine 50 mg tablet 50 mg PO DAILY 09/21/23 09/21/23    
     
tizanidine 4 mg tablet 4 mg PO BID PRN muscle spasticity 09/21/23 09/21/23    
    
    
    
                                    Allergies    
    
    
    
Allergy/AdvReac Type Severity Reaction Status Date / Time    
     
No Known Drug Allergies Allergy   Verified 09/21/23 23:49    
    
    
    
    
Review of Systems    
    
    
ROS      
    
 Status of ROS 10 or more systems reviewed and unremarkable except as noted in   
history and below       
    
    
PFSH    
PFSH    
Social History    
Smoking status:  Current every day smoker     
    
    
    
Exam    
Narrative    
Exam Narrative:     
Nurses note and vital signs reviewed; He is afebrile, tachycardic with a pulse   
of 117, blood pressure is moderately low 102/60, he is not hypoxic with pulse ox  
of 97 percent on room air    
    
General: Sleeping adult male lying on his right side, when stimulated he jumps   
up on the bed and starts shaking his legs simulating restless leg syndrome, he   
promptly then lays back down and is motionless    
Skin:  Warm, dry, no pallor noted.  There is no rash noted.    
Head:  Normocephalic, atraumatic    
Eye: Normal conjunctiva, no drainage, EOMI. PERRL    
Ears, Nose, Mouth, and Throat: oral mucosa is moist. Pt is edentulous    
Cardiovascular:  Regular Rate and Rhythm tachycardic at 117, no murmurs, rubs or  
gallops     
Respiratory:  Patient is in no distress, no accessory muscle use, lungs are   
clear to auscultation, no wheezing, rales or rhonchi    
Back:  non-tender, no CVA tenderness bilaterally to percussion.    
GI:  Normal bowel sounds, no tenderness to palpation, no masses appreciated.  No  
rebound, guarding, or rigidity noted.    
Musculoskeletal: Moving all extremities    
Neurological:  A&O x4, normal speech, no focal neurologic deficits    
Psychiatric: somnolence followed by psychomotor agitation followed by   
somnolence, does not verbalize homicidal or suicidal ideation    
Constitutional    
Vital Signs, click to edit/add:     
    
                                Last Vital Signs    
    
    
    
Temp  97.1 F L  09/21/23 23:43    
     
Pulse  117 H  09/21/23 23:43    
     
Resp  16   09/22/23 02:23    
     
BP  112/78   09/22/23 02:23    
     
Pulse Ox  96   09/22/23 02:23    
    
    
    
    
    
Course    
Vital Signs    
Vital signs:     
    
                                   Vital Signs    
    
    
    
Temperature  97.1 F L  09/21/23 23:43    
     
Pulse Rate  117 H  09/21/23 23:43    
     
Respiratory Rate  20   09/21/23 23:43    
     
Blood Pressure  102/60   09/21/23 23:43    
     
Pulse Oximetry  97   09/21/23 23:43    
    
    
                                            
    
    
    
Temperature  97.1 F L  09/21/23 23:43    
     
Pulse Rate  117 H  09/21/23 23:43    
     
Respiratory Rate  16   09/22/23 02:23    
     
Blood Pressure  112/78   09/22/23 02:23    
     
Pulse Oximetry  96   09/22/23 02:23    
    
    
    
    
    
Medical Decision Making    
MDM Narrative    
Medical decision making narrative:     
This 27-year-old male is brought emergency department by his mother who is   
concerned about his health as he is going through opiate withdrawal. He is on   
several medications that were recently prescribed to help him with his   
withdrawal symptoms. He has been staying at his girlfriend's mother's house   
until today when he went to his mother's house and asked her to bring him here.   
He is intermittently sleeping and then having episodes of psychomotor agitation.  
He states that he has restless leg syndrome. He is on medications for this. He   
has had nausea and some diarrhea. He has not had any vomiting while in emergency  
department. He is not an alcohol user excessively but does occasionally drink   
some beers. His symptoms do not appear to be related to alcohol withdrawal. EKG   
done upon arrival sinus tachycardia 100 bpm with no acute changes. An IV was   
placed and routine labs are ordered. His labs are normal with the exception of a  
mildly low potassium which was replaced orally. He was medicated in the   
emergency department with normal saline, Zofran, 1 mg of Ativan. He is   
tolerating clear liquids. He was able to tolerate oral potassium replacement. On  
reevaluation he appeared to be feeling somewhat better but would awaken with   
acute agitation. His mother seems very concerned and I agreed to give him an   
additional dose of Ativan as well as D5 LR to clear any ketosis as he has not   
eaten in the past several days. He does appear to be doing better at this time   
and his mother is comfortable taking him home. I extended her that there is no   
quick fix for opiate withdrawal. He should continue the medications that he is   
currently on. I encouraged him to drink plenty of clear liquids. He is scheduled  
to get a Vivitrol shot early next week.    
Lab Data    
Labs:     
    
                                   Lab Results    
    
    
    
  09/22/23 Range/Units    
    
  00:12     
     
WBC  7.8  (4.0-11.0)  10^3/uL    
     
RBC  4.22 L  (4.70-6.10)  10^6/uL    
     
Hgb  12.4 L  (14.0-18.0)  g/dL    
     
Hct  35.9 L  (42.0-54.0)  %    
     
MCV  85.1  (80.0-94.0)  fL    
     
MCH  29.4  (25.9-34.0)  pg    
     
MCHC  34.5  (29.9-35.2)  g/dL    
     
RDW  11.8  (11.0-15.0)  %    
     
Plt Count  280  (150-450)  10^3/uL    
     
MPV  9.0 L  (9.5-13.5)  fL    
     
Neut % (Auto)  69.3  (43.0-75.0)  %    
     
Lymph % (Auto)  23.8  (20.5-60.0)  %    
     
Mono % (Auto)  5.7  (1.7-12.0)  %    
     
Eos % (Auto)  0.8 L  (0.9-7.0)  %    
     
Baso % (Auto)  0.3  (0.2-2.0)  %    
     
Neut # (Auto)  5.4  (1.4-6.5)  10^3/uL    
     
Lymph # (Auto)  1.9  (1.2-3.8)  10^3/uL    
     
Mono # (Auto)  0.5  (0.3-0.8)  10^3/uL    
     
Eos # (Auto)  0.1  (0.0-0.7)  10^3/uL    
     
Baso # (Auto)  0.0  (0.0-0.1)  10^3/uL    
     
Abs Immat Gran (auto)  0.01  (0.00-0.03)  10^3/uL    
     
Imm/Tot Granulo (auto)  0.1  (0.0-0.5)  %    
     
Sodium  135 L  (136-145)  mmol/L    
     
Potassium  3.2 L  (3.5-5.1)  mmol/L    
     
Chloride  101  ()  mmol/L    
     
Carbon Dioxide  24.7  (21.0-32.0)  mmol/L    
     
Anion Gap  12.5      
     
BUN  10.0  (7.0-18.0)  mg/dL    
     
Creatinine  0.76  (0.70-1.30)  mg/dL    
     
Est GFR ( Amer)  >60  (>=60)      
     
Est GFR (Non-Af Amer)  >60  (>=60)      
     
BUN/Creatinine Ratio  13.2      
     
Glucose  100  ()  mg/dL    
     
Calcium  8.5  (8.5-10.1)  mg/dL    
     
Total Bilirubin  0.3  (0.2-1.0)  mg/dL    
     
AST  12 L  (15-37)  U/L    
     
ALT  25  (16-63)  U/L    
     
Alkaline Phosphatase  59  ()  U/L    
     
Total Protein  6.1 L  (6.4-8.2)  g/dL    
     
Albumin  3.2 L  (3.4-5.0)  g/dL    
     
Globulin  2.9  g/dL    
     
Albumin/Globulin Ratio  1.1      
    
    
    
    
ECG Data    
Attestation: I personally reviewed and interpreted this ECG as follows: (Tennis   
tachycardia at 100 beats for minute, normal axis, Normal intervals,moderate   
right axis deviation, no acute ST segment elevation or T-wave inversion)    
    
Discharge Plan    
Discharge    
Chief Complaint: Alcohol    
    
Clinical Impression:    
 Opiate withdrawal    
    
    
Patient Disposition: Home, Self-Care    
    
Time of Disposition Decision: 03:00    
    
Condition: Fair    
    
Prescriptions / Home Meds:    
No Action    
  tizanidine 4 mg tablet     
   4 mg PO BID PRN (Reason: muscle spasticity)     
  quetiapine 50 mg tablet     
   50 mg PO DAILY     
   Rx Instructions:    
   1-2 tablets at HS for insomnia    
  pramipexole 0.5 mg tablet     
   0.5 mg PO DAILY     
   Rx Instructions:    
   1-3 tablets at bed time for restless legs    
  dicyclomine 20 mg tablet     
   20 mg PO TID     
    
Instructions:  Opioid Withdrawal (ED), Narcotic Withdrawal (ED)    
    
Stand Alone Forms:  Portal Instructions    
    
Referrals:    
Ashutosh Diaz MD [Primary Care Provider] - 1 week

## 2023-09-22 NOTE — ECG_ITS
[facility] 
                                        
                                       Test Date:    2023 
Pat Name:     YANELI CHRISTOPHER            Department:    
Patient ID:   [id number]               Room:         - 
Gender:       Male                     Technician:    
:          [id number]               Requested By: [id number] 
Order Number: [id number]              Reading MD:   CLARISA CHRISTINE 
                                 Measurements 
Intervals                              Axis           
Rate:         100                      P:            73 
UT:           152                      QRS:          94 
QRSD:         90                       T:            72 
QT:           352                                     
QTc:          409                                     
                           Interpretive Statements 
Non-Specific T wave inversion in aVL 
1120 Sinus tachycardia 
1570 with occasional ventricular premature complexes 
7102 Moderate right axis deviation 
9140 **  abnormal rhythm ECG  ** 
Compared to ECG 2023 19:48:02 
Ventricular premature complex(es) now present 
Electronically Signed On 2023 7:16:33 EDT by CLARISA CHRISTINE

## 2023-09-23 ENCOUNTER — HOSPITAL ENCOUNTER (EMERGENCY)
Age: 28
Discharge: HOME | End: 2023-09-23
Payer: MEDICAID

## 2023-09-23 VITALS — OXYGEN SATURATION: 100 % | HEART RATE: 122 BPM

## 2023-09-23 VITALS
RESPIRATION RATE: 17 BRPM | HEART RATE: 124 BPM | OXYGEN SATURATION: 100 % | DIASTOLIC BLOOD PRESSURE: 89 MMHG | SYSTOLIC BLOOD PRESSURE: 136 MMHG

## 2023-09-23 DIAGNOSIS — Z79.899: ICD-10-CM

## 2023-09-23 DIAGNOSIS — F11.23: Primary | ICD-10-CM

## 2023-09-23 DIAGNOSIS — F17.210: ICD-10-CM

## 2023-09-23 PROCEDURE — 99283 EMERGENCY DEPT VISIT LOW MDM: CPT

## 2023-09-23 PROCEDURE — 93005 ELECTROCARDIOGRAM TRACING: CPT

## 2023-09-23 NOTE — ED.GENADUL1
HPI - General Adult
General
Chief complaint: Dizziness
Stated complaint: dizzy
Time Seen by Provider: 09/23/23 00:53
Source: patient
Mode of arrival: walk-in
Limitations: no limitations
History of Present Illness
HPI narrative: 
patient has history of polysubstance abuse. Fentanyl, xanax etc. Was seen and started on treatment for withdrawal including Mirapex, zanaflex, bentyl and seroquel. Tonight his sister was concerned about his shakes and body movements. He states he 
feels light headed. No vomiting. He states he took one xanax yesterday. States he had saved some xanax pills to help him deal with withdrawal if he needed them. Does not take xanax regularly 
Last use of drugs , other than xanax was 4 days ago
Related Data
Home Medications

 Medication  Instructions  Recorded  Confirmed
dicyclomine 20 mg tablet 20 mg PO TID 09/21/23 09/21/23
pramipexole 0.5 mg tablet 0.5 mg PO DAILY 09/21/23 09/21/23
quetiapine 50 mg tablet 50 mg PO DAILY 09/21/23 09/21/23
tizanidine 4 mg tablet 4 mg PO BID PRN muscle spasticity 09/21/23 09/21/23


Allergies

Allergy/AdvReac Type Severity Reaction Status Date / Time
No Known Drug Allergies Allergy   Verified 09/21/23 23:49



Review of Systems
ROS  
 Status of ROS 10 or more systems reviewed and unremarkable except as noted in history and below   

[facility]
PFS
Social History
Smoking status:  Current every day smoker 



Exam
Constitutional
Vital Signs, click to edit/add: 

Last Vital Signs

Pulse  124 H  09/23/23 00:48
Resp  17   09/23/23 00:48
BP  136/89   09/23/23 00:48
Pulse Ox  100   09/23/23 00:48
O2 Del Method  Room Air  09/23/23 00:48



Common normals: average body habitus, oriented x3 and alert
Other: 
rocking back and forth but not in discomfort
Eye
Common normals: EOMs intact bilaterally and conjunctivae normal
Respiratory
Common normals: normal respiratory effort and no retractions
Cardio
Rate: tachycardic
GI
Common normals: Normal to inspection, nondistended, normoactive bowel sounds present and soft to palpation
Extremity
Common normals: normal to inspection and full ROM
Neuro
Common normals: oriented x3, CN's II-XII intact bilaterally, moves all extremities and no focal motor deficits
Psych
Appearance: grossly normal

Course
Vital Signs
Vital signs: 

Vital Signs

Pulse Rate  124 H  09/23/23 00:48
Respiratory Rate  17   09/23/23 00:48
Blood Pressure  136/89   09/23/23 00:48
Pulse Oximetry  100   09/23/23 00:48
Oxygen Delivery Method  Room Air  09/23/23 00:48



Pulse Rate  124 H  09/23/23 00:48
Respiratory Rate  17   09/23/23 00:48
Blood Pressure  136/89   09/23/23 00:48
Pulse Oximetry  100   09/23/23 00:48
Oxygen Delivery Method  Room Air  09/23/23 00:48




Medical Decision Making
MDM Narrative
Medical decision making narrative: 
patient presents with mild withdrawal symptoms. Restless and not able to sit stil . occ jerks. AxOx4
cooperative. His sister was concern he was withdrawing from Xanax and that it could get worse. He informed of both that he only took one xanax yesterday and does not take them regularly. Patient and sister informed he should not experience 
withdrawal from one xanax pill. He does have withdrawal symptoms from his opiates . Clinically he does look stable but is tachy and advised to increase fluid intake

Discharge Plan
Discharge
Chief Complaint: Dizziness

Clinical Impression:
 Opiate withdrawal


Patient Disposition: Home, Self-Care

Prescriptions / Home Meds:
No Action
  tizanidine 4 mg tablet 
   4 mg PO BID PRN (Reason: muscle spasticity) 
  quetiapine 50 mg tablet 
   50 mg PO DAILY 
   Rx Instructions:
   1-2 tablets at HS for insomnia
  pramipexole 0.5 mg tablet 
   0.5 mg PO DAILY 
   Rx Instructions:
   1-3 tablets at bed time for restless legs
  dicyclomine 20 mg tablet 
   20 mg PO TID 

Instructions:  Opioid Withdrawal (ED)

Stand Alone Forms:  Portal Instructions

Referrals:
Ashutosh Diaz MD [Primary Care Provider] - 1 week

## 2023-09-23 NOTE — ECG_ITS
[facility] 
                                        
                                       Test Date:    2023 
Pat Name:     YANELI CHRISTOPHER            Department:    
Patient ID:   [id number]               Room:         - 
Gender:       Male                     Technician:    
:          [id number]               Requested By: [id number] 
Order Number: [id number]              Reading MD:   CLARISA CHRISTINE 
                                 Measurements 
Intervals                              Axis           
Rate:         104                      P:            55 
PA:           124                      QRS:          101 
QRSD:         86                       T:            67 
QT:           338                                     
QTc:          399                                     
                           Interpretive Statements 
1120 Sinus tachycardia 
7100 Abnormal right axis deviation 
9140 **  abnormal rhythm ECG  ** 
Compared to ECG 2023 23:51:50 
T-wave abnormality no longer present 
Ventricular premature complex(es) no longer present 
Electronically Signed On 2023 7:16:51 EDT by CLARISA CHRISTINE

## 2023-09-23 NOTE — ED_ITS
HPI - General Adult    
General    
Chief complaint: Dizziness    
Stated complaint: dizzy    
Time Seen by Provider: 09/23/23 00:53    
Source: patient    
Mode of arrival: walk-in    
Limitations: no limitations    
History of Present Illness    
HPI narrative:     
patient has history of polysubstance abuse. Fentanyl, xanax etc. Was seen and   
started on treatment for withdrawal including Mirapex, zanaflex, bentyl and   
seroquel. Tonight his sister was concerned about his shakes and body movements.   
He states he feels light headed. No vomiting. He states he took one xanax   
yesterday. States he had saved some xanax pills to help him deal with withdrawal  
if he needed them. Does not take xanax regularly     
Last use of drugs , other than xanax was 4 days ago    
Related Data    
                                Home Medications    
    
    
    
 Medication  Instructions  Recorded  Confirmed    
     
dicyclomine 20 mg tablet 20 mg PO TID 09/21/23 09/21/23    
     
pramipexole 0.5 mg tablet 0.5 mg PO DAILY 09/21/23 09/21/23    
     
quetiapine 50 mg tablet 50 mg PO DAILY 09/21/23 09/21/23    
     
tizanidine 4 mg tablet 4 mg PO BID PRN muscle spasticity 09/21/23 09/21/23    
    
    
    
                                    Allergies    
    
    
    
Allergy/AdvReac Type Severity Reaction Status Date / Time    
     
No Known Drug Allergies Allergy   Verified 09/21/23 23:49    
    
    
    
    
Review of Systems    
    
    
ROS      
    
 Status of ROS 10 or more systems reviewed and unremarkable except as noted in   
history and below       
    
    
[facility]    
PFS    
Social History    
Smoking status:  Current every day smoker     
    
    
    
Exam    
Constitutional    
Vital Signs, click to edit/add:     
    
                                Last Vital Signs    
    
    
    
Pulse  124 H  09/23/23 00:48    
     
Resp  17   09/23/23 00:48    
     
BP  136/89   09/23/23 00:48    
     
Pulse Ox  100   09/23/23 00:48    
     
O2 Del Method  Room Air  09/23/23 00:48    
    
    
    
    
Common normals: average body habitus, oriented x3 and alert    
Other:     
rocking back and forth but not in discomfort    
Eye    
Common normals: EOMs intact bilaterally and conjunctivae normal    
Respiratory    
Common normals: normal respiratory effort and no retractions    
Cardio    
Rate: tachycardic    
GI    
Common normals: Normal to inspection, nondistended, normoactive bowel sounds   
present and soft to palpation    
Extremity    
Common normals: normal to inspection and full ROM    
Neuro    
Common normals: oriented x3, CN's II-XII intact bilaterally, moves all   
extremities and no focal motor deficits    
Psych    
Appearance: grossly normal    
    
Course    
Vital Signs    
Vital signs:     
    
                                   Vital Signs    
    
    
    
Pulse Rate  124 H  09/23/23 00:48    
     
Respiratory Rate  17   09/23/23 00:48    
     
Blood Pressure  136/89   09/23/23 00:48    
     
Pulse Oximetry  100   09/23/23 00:48    
     
Oxygen Delivery Method  Room Air  09/23/23 00:48    
    
    
                                            
    
    
    
Pulse Rate  124 H  09/23/23 00:48    
     
Respiratory Rate  17   09/23/23 00:48    
     
Blood Pressure  136/89   09/23/23 00:48    
     
Pulse Oximetry  100   09/23/23 00:48    
     
Oxygen Delivery Method  Room Air  09/23/23 00:48    
    
    
    
    
    
Medical Decision Making    
MDM Narrative    
Medical decision making narrative:     
patient presents with mild withdrawal symptoms. Restless and not able to sit   
stil . occ jerks. AxOx4    
cooperative. His sister was concern he was withdrawing from Xanax and that it   
could get worse. He informed of both that he only took one xanax yesterday and   
does not take them regularly. Patient and sister informed he should not   
experience withdrawal from one xanax pill. He does have withdrawal symptoms from  
his opiates . Clinically he does look stable but is tachy and advised to   
increase fluid intake    
    
Discharge Plan    
Discharge    
Chief Complaint: Dizziness    
    
Clinical Impression:    
 Opiate withdrawal    
    
    
Patient Disposition: Home, Self-Care    
    
Prescriptions / Home Meds:    
No Action    
  tizanidine 4 mg tablet     
   4 mg PO BID PRN (Reason: muscle spasticity)     
  quetiapine 50 mg tablet     
   50 mg PO DAILY     
   Rx Instructions:    
   1-2 tablets at HS for insomnia    
  pramipexole 0.5 mg tablet     
   0.5 mg PO DAILY     
   Rx Instructions:    
   1-3 tablets at bed time for restless legs    
  dicyclomine 20 mg tablet     
   20 mg PO TID     
    
Instructions:  Opioid Withdrawal (ED)    
    
Stand Alone Forms:  Portal Instructions    
    
Referrals:    
Ashutosh Diaz MD [Primary Care Provider] - 1 week